# Patient Record
Sex: FEMALE | Race: WHITE | NOT HISPANIC OR LATINO | Employment: FULL TIME | URBAN - METROPOLITAN AREA
[De-identification: names, ages, dates, MRNs, and addresses within clinical notes are randomized per-mention and may not be internally consistent; named-entity substitution may affect disease eponyms.]

---

## 2024-06-28 ENCOUNTER — LAB VISIT (OUTPATIENT)
Dept: LAB | Facility: HOSPITAL | Age: 44
End: 2024-06-28
Attending: NURSE PRACTITIONER
Payer: COMMERCIAL

## 2024-06-28 DIAGNOSIS — K75.4 AUTOIMMUNE HEPATITIS: ICD-10-CM

## 2024-06-28 LAB
ALBUMIN SERPL-MCNC: 3.9 G/DL (ref 3.5–5)
ALP SERPL-CCNC: 71 UNIT/L (ref 40–150)
ALT SERPL-CCNC: 10 UNIT/L (ref 0–55)
AST SERPL-CCNC: 13 UNIT/L (ref 5–34)
BILIRUB DIRECT SERPL-MCNC: 0.2 MG/DL (ref 0–?)
BILIRUB SERPL-MCNC: 0.4 MG/DL
BILIRUBIN DIRECT+TOT PNL SERPL-MCNC: 0.2 MG/DL (ref 0–0.8)
ERYTHROCYTE [DISTWIDTH] IN BLOOD BY AUTOMATED COUNT: 12.9 % (ref 11.5–17)
HCT VFR BLD AUTO: 38.7 % (ref 37–47)
HGB BLD-MCNC: 13.3 G/DL (ref 12–16)
MCH RBC QN AUTO: 33.6 PG (ref 27–31)
MCHC RBC AUTO-ENTMCNC: 34.4 G/DL (ref 33–36)
MCV RBC AUTO: 97.7 FL (ref 80–94)
NRBC BLD AUTO-RTO: 0 %
PLATELET # BLD AUTO: 217 X10(3)/MCL (ref 130–400)
PMV BLD AUTO: 10.9 FL (ref 7.4–10.4)
PROT SERPL-MCNC: 7.4 GM/DL (ref 6.4–8.3)
RBC # BLD AUTO: 3.96 X10(6)/MCL (ref 4.2–5.4)
WBC # BLD AUTO: 9.21 X10(3)/MCL (ref 4.5–11.5)

## 2024-06-28 PROCEDURE — 80076 HEPATIC FUNCTION PANEL: CPT

## 2024-06-28 PROCEDURE — 36415 COLL VENOUS BLD VENIPUNCTURE: CPT

## 2024-06-28 PROCEDURE — 85027 COMPLETE CBC AUTOMATED: CPT

## 2024-07-12 ENCOUNTER — OFFICE VISIT (OUTPATIENT)
Dept: HEPATOLOGY | Facility: CLINIC | Age: 44
End: 2024-07-12
Payer: COMMERCIAL

## 2024-07-12 DIAGNOSIS — K75.4 AUTOIMMUNE HEPATITIS: Primary | ICD-10-CM

## 2024-07-12 RX ORDER — HYDROXYCHLOROQUINE SULFATE 200 MG/1
200 TABLET, FILM COATED ORAL DAILY
COMMUNITY
Start: 2024-04-29

## 2024-07-12 RX ORDER — AZATHIOPRINE 50 MG/1
100 TABLET ORAL DAILY
Qty: 60 TABLET | Refills: 11 | Status: SHIPPED | OUTPATIENT
Start: 2024-07-12

## 2024-07-12 RX ORDER — MONTELUKAST SODIUM 10 MG/1
10 TABLET ORAL DAILY
COMMUNITY
Start: 2024-03-06

## 2024-07-12 NOTE — PATIENT INSTRUCTIONS
Fibroscan was normal, no scar tissue or significant fatty liver     Follow up in 1 year on video visit with labs before    US and fibroscan repeat in 2026

## 2024-07-12 NOTE — PROGRESS NOTES
The patient location is: [place]  The chief complaint leading to consultation is: see below    Visit type: audiovisual    Face to Face time with patient: 20 minutes of total time spent on the encounter, which includes face to face time and non-face to face time preparing to see the patient (eg, review of tests), Obtaining and/or reviewing separately obtained history, Documenting clinical information in the electronic or other health record, Independently interpreting results (not separately reported) and communicating results to the patient/family/caregiver, or Care coordination (not separately reported).         Each patient to whom he or she provides medical services by telemedicine is:  (1) informed of the relationship between the physician and patient and the respective role of any other health care provider with respect to management of the patient; and (2) notified that he or she may decline to receive medical services by telemedicine and may withdraw from such care at any time.    Notes:      [facility] Established Patient Visit    Reason for Visit:  [facility]    PCP: Meeks, Claude H.    HPI:  This is a 43 y.o. female with PMH noted below, here for follow up of Autoimmune hepatitis      Diagnosed at age 19. A liver biopsy in 2001 revealed moderate-to-severe portal triaditis with central venous inflammation, hepatocellular loss, and bridging inflammation, all consistent with autoimmune hepatitis.      She had a clinical and biochemical remission induced with combination prednisone and Imuran.     Per chart review: She was on low-dose prednisone as well as Imuran until 2005 when she stopped the Imuran because she wanted to become pregnant. She was maintained on prednisone 5 mg monotherapy until she developed a mild biochemical flare and was started on Imuran 50 mg in addition to the prednisone 5. She subsequently ran out of the Imuran and was unable to refill it so she was on prednisone monotherapy 5/2012.    Treatment course:   -- on Pred monotherapy  - 2020  -- started Imuran 75 mg daily 2020  -- prednisone weaned to off, off completely 3/2021    Liver fibrosis staging  -- fibroscan 2022 noted F0, S0 (kPA 5.5, )  -- fibroscan 2023 noted F0, S0 (kPA 4.9, )  -- fibroscan repeat due in       Interval HPI: Presents today alone via video visit.  Doing well. On Imuran 100 mg daily. Liver enzymes normal   Reports she is UTD with all routine cancer screening with her local providers    Labs done 2024 show normal transaminase levels   Platelets WNL  Synthetic liver functioning  WNL    Lab Results   Component Value Date    ALT 10 2024    AST 13 2024    ALKPHOS 71 2024    BILITOT 0.4 2024    ALBUMIN 3.9 2024    INR 0.9 2023     2024     Abd US done 2023 noted no abnormality of liver or spleen - will repeat in       Denies alcohol consumption      + Immunity to Hep A and B     PMHX:  has a past medical history of Autoimmune hepatitis, Hidradenitis suppurativa, and Osteopenia (2019).    PSHX:  has a past surgical history that includes Liver biopsy ();  section, low transverse; tubaligation; uterine ablation; and [place] tooth extraction.    The patient's social and family histories were reviewed by me and updated in the appropriate section of the electronic medical record.    Review of patient's allergies indicates:  No Known Allergies    Current Outpatient Medications on File Prior to Visit   Medication Sig Dispense Refill    azaTHIOprine (IMURAN) 50 mg Tab Take 2 tablets (100 mg total) by mouth once daily. 60 tablet 11    calcium-vitamin D3 500 mg(1,250mg) -200 unit per tablet Take 1 tablet by mouth 2 (two) times daily with meals.      doxycycline (PERIOSTAT) 20 MG tablet Take 20 mg by mouth 2 (two) times daily.      escitalopram oxalate (LEXAPRO) 20 MG tablet Take 20 mg by mouth every morning.      Lactobacillus rhamnosus GG  (CULTURELLE) 10 billion cell capsule Take 1 capsule by mouth once daily.      omega-3s-dha-epa-fish oil-D3 (OMEGA-3 + VITAMIN D3) 150-500-200 mg-mg-unit CpDR Take by mouth.      PRENATAL VIT/IRON FUMARATE/FA (PRENATAL 1+1 ORAL) Take by mouth once daily.      spironolactone (ALDACTONE) 100 MG tablet Take 100 mg by mouth once daily.       No current facility-administered medications on file prior to visit.       SOCIAL HISTORY:   Social History     Substance and Sexual Activity   Alcohol Use Yes    Comment: twice weekly       Social History     Substance and Sexual Activity   Drug Use No       ROS:   GENERAL:+ intermittent fatigue  CARDIOVASCULAR: Denies edema  GI: Denies abdominal pain  SKIN: Denies rash, itching   NEURO: Denies confusion, memory loss, or mood changes    Objective Findings:    PHYSICAL EXAM:   Friendly White female, in no acute distress; alert and oriented to person, place and time  VITALS: There were no vitals taken for this visit.  EYES: Sclerae anicteric  GI: Soft, non-tender, non-distended. No ascites.  EXTREMITIES:  No edema.  SKIN: Warm and dry. No jaundice. No telangectasias noted. No palmar erythema.  NEURO:  No asterixis.  PSYCH:  Thought and speech pattern appropriate. Behavior normal      EDUCATION:  See instructions discussed with patient in Instructions section of the After Visit Summary       ASSESSMENT & PLAN:  43 y.o. White female with:  1. Autoimmune hepatitis   -- taking Imuran 100 mg daily, AIH well controlled, refilled   -- US 7/2023 without abnormality - can repeat in 2026  -- Labs show normal transaminase levels  --- synthetic liver function WNL  -- past liver biopsy in 2001 showed AIH   -- past fibroscan 7/2023 without fibrosis or steatosis - can repeat in 2026          Follow up in about 1 year (around 7/12/2025). Video visit with labs before in [place]  Orders Placed This Encounter   Procedures    Hepatic Function Panel    CBC Without Differential       Thank you for  allowing me to participate in the care of Elsa NELLI Leong    I spent a total of 20 minutes on the day of the visit.This includes face to face time and non-face to face time preparing to see the patient (eg, review of tests), obtaining and/or reviewing separately obtained history, documenting clinical information in the electronic or other health record, independently interpreting results and communicating results to the patient/family/caregiver, and coordinating care.       CC'ed note to:

## 2024-09-08 ENCOUNTER — PATIENT MESSAGE (OUTPATIENT)
Dept: HEPATOLOGY | Facility: CLINIC | Age: 44
End: 2024-09-08
Payer: COMMERCIAL

## 2024-11-12 ENCOUNTER — PATIENT MESSAGE (OUTPATIENT)
Dept: HEPATOLOGY | Facility: CLINIC | Age: 44
End: 2024-11-12
Payer: COMMERCIAL

## 2025-07-10 ENCOUNTER — LAB VISIT (OUTPATIENT)
Dept: LAB | Facility: HOSPITAL | Age: 45
End: 2025-07-10
Attending: NURSE PRACTITIONER
Payer: COMMERCIAL

## 2025-07-10 DIAGNOSIS — K75.4 AUTOIMMUNE HEPATITIS: ICD-10-CM

## 2025-07-10 LAB
ALBUMIN SERPL-MCNC: 3.8 G/DL (ref 3.5–5)
ALP SERPL-CCNC: 68 UNIT/L (ref 40–150)
ALT SERPL-CCNC: 14 UNIT/L (ref 0–55)
AST SERPL-CCNC: 17 UNIT/L (ref 11–45)
BILIRUB DIRECT SERPL-MCNC: 0.3 MG/DL (ref 0–?)
BILIRUB INDIRECT SERPL-MCNC: 0.6 MG/DL (ref 0–0.8)
BILIRUB SERPL-MCNC: 0.9 MG/DL
ERYTHROCYTE [DISTWIDTH] IN BLOOD BY AUTOMATED COUNT: 13.8 % (ref 11.5–17)
HCT VFR BLD AUTO: 39.5 % (ref 37–47)
HGB BLD-MCNC: 13.7 G/DL (ref 12–16)
MCH RBC QN AUTO: 32.8 PG (ref 27–31)
MCHC RBC AUTO-ENTMCNC: 34.7 G/DL (ref 33–36)
MCV RBC AUTO: 94.5 FL (ref 80–94)
NRBC BLD AUTO-RTO: 0 %
PLATELET # BLD AUTO: 248 X10(3)/MCL (ref 130–400)
PMV BLD AUTO: 10.3 FL (ref 7.4–10.4)
PROT SERPL-MCNC: 7.5 GM/DL (ref 6.4–8.3)
RBC # BLD AUTO: 4.18 X10(6)/MCL (ref 4.2–5.4)
WBC # BLD AUTO: 9.91 X10(3)/MCL (ref 4.5–11.5)

## 2025-07-10 PROCEDURE — 80076 HEPATIC FUNCTION PANEL: CPT

## 2025-07-10 PROCEDURE — 85027 COMPLETE CBC AUTOMATED: CPT

## 2025-07-10 PROCEDURE — 36415 COLL VENOUS BLD VENIPUNCTURE: CPT

## 2025-07-11 ENCOUNTER — OFFICE VISIT (OUTPATIENT)
Dept: HEPATOLOGY | Facility: CLINIC | Age: 45
End: 2025-07-11
Payer: COMMERCIAL

## 2025-07-11 VITALS — HEIGHT: 65 IN | BODY MASS INDEX: 26.66 KG/M2 | WEIGHT: 160 LBS

## 2025-07-11 DIAGNOSIS — K75.4 AUTOIMMUNE HEPATITIS: Primary | ICD-10-CM

## 2025-07-11 RX ORDER — AZATHIOPRINE 50 MG/1
100 TABLET ORAL DAILY
Qty: 60 TABLET | Refills: 11 | Status: SHIPPED | OUTPATIENT
Start: 2025-07-11

## 2025-07-11 NOTE — PROGRESS NOTES
The patient location is: [place]  The chief complaint leading to consultation is: see below    Visit type: audiovisual    Face to Face time with patient: 20 minutes of total time spent on the encounter, which includes face to face time and non-face to face time preparing to see the patient (eg, review of tests), Obtaining and/or reviewing separately obtained history, Documenting clinical information in the electronic or other health record, Independently interpreting results (not separately reported) and communicating results to the patient/family/caregiver, or Care coordination (not separately reported).         Each patient to whom he or she provides medical services by telemedicine is:  (1) informed of the relationship between the physician and patient and the respective role of any other health care provider with respect to management of the patient; and (2) notified that he or she may decline to receive medical services by telemedicine and may withdraw from such care at any time.    Notes:       [facility] Established Patient Visit    Reason for Visit:  [facility]    PCP: Meeks, Claude H.    HPI:  This is a 44 y.o. female with PMH noted below, here for follow up of above      Diagnosed at age 19. A liver biopsy in 2001 revealed moderate-to-severe portal triaditis with central venous inflammation, hepatocellular loss, and bridging inflammation, all consistent with autoimmune hepatitis.      She had a clinical and biochemical remission induced with combination prednisone and Imuran.     Per chart review: She was on low-dose prednisone as well as Imuran until 2005 when she stopped the Imuran because she wanted to become pregnant. She was maintained on prednisone 5 mg monotherapy until she developed a mild biochemical flare and was started on Imuran 50 mg in addition to the prednisone 5. She subsequently ran out of the Imuran and was unable to refill it so she was on prednisone monotherapy 5/2012.    Treatment course:   -- on Pred monotherapy  - 2020  -- started Imuran 75 mg daily 2020  -- prednisone weaned to off, off completely 3/2021  -- Imuran alone since 3/2021    Liver fibrosis staging  -- fibroscan 2022 noted F0, S0 (kPA 5.5, )  -- fibroscan 2023 noted F0, S0 (kPA 4.9, )  -- fibroscan repeat due in  if any abnormality noted on US      Interval HPI: Presents today alone via video visit.  Doing well. On Imuran 100 mg daily. Liver enzymes normal   Reports she is UTD with all routine cancer screening with her local providers, followed by shi CHAN, intermittently on meds but none currently     Labs done 2025 show normal transaminase levels   Platelets WNL  Synthetic liver functioning  WNL    Lab Results   Component Value Date    ALT 14 07/10/2025    AST 17 07/10/2025    ALKPHOS 68 07/10/2025    BILITOT 0.9 07/10/2025    ALBUMIN 3.8 07/10/2025    INR 0.9 2023     07/10/2025     Abd US done 2023 noted no abnormality of liver or spleen - will repeat in       Denies alcohol consumption      + Immunity to Hep A and B     PMHX:  has a past medical history of Autoimmune hepatitis, Hidradenitis suppurativa, and Osteopenia (2019).    PSHX:  has a past surgical history that includes Liver biopsy ();  section, low transverse; tubaligation; uterine ablation; and [place] tooth extraction.    The patient's social and family histories were reviewed by me and updated in the appropriate section of the electronic medical record.    Review of patient's allergies indicates:  No Known Allergies    Current Outpatient Medications on File Prior to Visit   Medication Sig Dispense Refill    azaTHIOprine (IMURAN) 50 mg Tab Take 2 tablets (100 mg total) by mouth once daily. 60 tablet 11    calcium-vitamin D3 500 mg(1,250mg) -200 unit per tablet Take 1 tablet by mouth 2 (two) times daily with meals.      doxycycline (PERIOSTAT) 20 MG tablet Take 20 mg by mouth 2  "(two) times daily.      escitalopram oxalate (LEXAPRO) 20 MG tablet Take 20 mg by mouth every morning.      hydroxychloroquine (PLAQUENIL) 200 mg tablet Take 200 mg by mouth once daily.      Lactobacillus rhamnosus GG (CULTURELLE) 10 billion cell capsule Take 1 capsule by mouth once daily.      montelukast (SINGULAIR) 10 mg tablet Take 10 mg by mouth once daily.      omega-3s-dha-epa-fish oil-D3 (OMEGA-3 + VITAMIN D3) 150-500-200 mg-mg-unit CpDR Take by mouth.      PRENATAL VIT/IRON FUMARATE/FA (PRENATAL 1+1 ORAL) Take by mouth once daily.      spironolactone (ALDACTONE) 100 MG tablet Take 100 mg by mouth once daily.       No current facility-administered medications on file prior to visit.       SOCIAL HISTORY:   Social History     Substance and Sexual Activity   Alcohol Use Yes    Comment: twice weekly       Social History     Substance and Sexual Activity   Drug Use No       ROS:   GENERAL:+ intermittent fatigue  CARDIOVASCULAR: Denies edema  GI: Denies abdominal pain  SKIN: Denies rash, itching   NEURO: Denies confusion, memory loss, or mood changes    Objective Findings:    PHYSICAL EXAM:   Friendly White female, in no acute distress; alert and oriented to person, place and time  VITALS: Ht 5' 5" (1.651 m)   Wt 72.6 kg (160 lb)   BMI 26.63 kg/m²   EYES: Sclerae anicteric  GI: Soft, non-tender, non-distended. No ascites.  EXTREMITIES:  No edema.  SKIN: Warm and dry. No jaundice. No telangectasias noted. No palmar erythema.  NEURO:  No asterixis.  PSYCH:  Thought and speech pattern appropriate. Behavior normal      EDUCATION:  See instructions discussed with patient in Instructions section of the After Visit Summary       ASSESSMENT & PLAN:  44 y.o. White female with:  1. Autoimmune hepatitis   -- taking Imuran 100 mg daily, AIH well controlled, refilled   -- US 7/2023 without abnormality - can repeat in 2026  -- Labs show normal transaminase levels  --- synthetic liver function WNL  -- past liver biopsy in " 2001 showed AIH   -- past fibroscan 7/2023 without fibrosis or steatosis - can repeat if needed if future US notes fatty liver or any abnormality           Follow up in about 1 year (around 7/11/2026). Video visit with labs and US before in [facility] Placed This Encounter   Procedures    US Abdomen Complete    CBC Without Differential    Comprehensive Metabolic Panel       Thank you for allowing me to participate in the care of CK MunsonC    I spent a total of 20 minutes on the day of the visit.This includes face to face time and non-face to face time preparing to see the patient (eg, review of tests), obtaining and/or reviewing separately obtained history, documenting clinical information in the electronic or other health record, independently interpreting results and communicating results to the patient/family/caregiver, and coordinating care.       CC'ed note to: